# Patient Record
Sex: FEMALE | Race: WHITE | Employment: FULL TIME | URBAN - METROPOLITAN AREA
[De-identification: names, ages, dates, MRNs, and addresses within clinical notes are randomized per-mention and may not be internally consistent; named-entity substitution may affect disease eponyms.]

---

## 2019-05-31 ENCOUNTER — APPOINTMENT (OUTPATIENT)
Dept: GENERAL RADIOLOGY | Age: 49
End: 2019-05-31

## 2019-05-31 ENCOUNTER — HOSPITAL ENCOUNTER (EMERGENCY)
Age: 49
Discharge: HOME | End: 2019-05-31
Attending: EMERGENCY MEDICINE

## 2019-05-31 VITALS
WEIGHT: 265 LBS | SYSTOLIC BLOOD PRESSURE: 178 MMHG | OXYGEN SATURATION: 100 % | BODY MASS INDEX: 44.15 KG/M2 | HEIGHT: 65 IN | RESPIRATION RATE: 16 BRPM | TEMPERATURE: 97.8 F | HEART RATE: 84 BPM | DIASTOLIC BLOOD PRESSURE: 105 MMHG

## 2019-05-31 DIAGNOSIS — M17.11 OSTEOARTHRITIS OF RIGHT KNEE, UNSPECIFIED OSTEOARTHRITIS TYPE: ICD-10-CM

## 2019-05-31 DIAGNOSIS — M25.461 KNEE EFFUSION, RIGHT: Primary | ICD-10-CM

## 2019-05-31 PROCEDURE — 99283 EMERGENCY DEPT VISIT LOW MDM: CPT

## 2019-05-31 PROCEDURE — 73560 X-RAY EXAM OF KNEE 1 OR 2: CPT

## 2019-05-31 ASSESSMENT — PAIN DESCRIPTION - DESCRIPTORS: DESCRIPTORS: THROBBING

## 2019-05-31 ASSESSMENT — PAIN SCALES - GENERAL: PAINLEVEL_OUTOF10: 5

## 2019-05-31 ASSESSMENT — PAIN DESCRIPTION - FREQUENCY: FREQUENCY: CONTINUOUS

## 2019-05-31 ASSESSMENT — ENCOUNTER SYMPTOMS
ABDOMINAL PAIN: 0
SHORTNESS OF BREATH: 0
BACK PAIN: 0

## 2019-05-31 ASSESSMENT — PAIN DESCRIPTION - ORIENTATION: ORIENTATION: RIGHT

## 2019-05-31 ASSESSMENT — PAIN DESCRIPTION - PROGRESSION: CLINICAL_PROGRESSION: NOT CHANGED

## 2019-05-31 ASSESSMENT — PAIN DESCRIPTION - LOCATION: LOCATION: KNEE

## 2019-05-31 ASSESSMENT — PAIN DESCRIPTION - ONSET: ONSET: ON-GOING

## 2019-05-31 ASSESSMENT — PAIN DESCRIPTION - PAIN TYPE: TYPE: ACUTE PAIN

## 2019-05-31 NOTE — ED PROVIDER NOTES
Patient is known to have arthritis in her right knee she says that recently she is to develop some swelling in it which makes it more difficult to walk  She denies any recent plane rides, long car rides  Recent surgeries or  Denies history of DVT  Denies chest pain shortness breath all of her discomfort is localized to the right knee itself  Patient denies fever chills patient is not immunocompromised    The history is provided by the patient. Knee Problem   Location:  Knee  Knee location:  R knee  Pain details:     Quality:  Aching    Radiates to:  Does not radiate    Severity:  Moderate    Onset quality:  Gradual    Timing:  Constant    Progression:  Worsening  Chronicity:  New  Dislocation: no    Foreign body present:  No foreign bodies  Prior injury to area:  No  Associated symptoms: no back pain, no fever and no neck pain        Review of Systems   Constitutional: Negative for chills and fever. Respiratory: Negative for shortness of breath. Cardiovascular: Negative for chest pain. Gastrointestinal: Negative for abdominal pain. Musculoskeletal: Positive for arthralgias, gait problem and joint swelling. Negative for back pain and neck pain. Neurological: Negative for dizziness. Psychiatric/Behavioral: Negative for behavioral problems. All other systems reviewed and are negative. Patient Vitals for the past 24 hrs:   BP Temp Temp src Pulse Resp SpO2 Height Weight   05/31/19 1703 (!) 178/105 97.8 °F (36.6 °C) Oral 84 16 100 % 5' 5\" (1.651 m) 265 lb (120.2 kg)       Physical Exam   Constitutional: She is oriented to person, place, and time. She appears well-developed and well-nourished. No distress. HENT:   Head: Normocephalic. Right Ear: External ear normal.   Left Ear: External ear normal.   Eyes: Pupils are equal, round, and reactive to light. Conjunctivae and EOM are normal.   Neck: Normal range of motion. Neck supple. No thyromegaly present.    Cardiovascular: Normal rate, regular rhythm, normal heart sounds and intact distal pulses. Exam reveals no gallop and no friction rub. No murmur heard. Pulmonary/Chest: Effort normal and breath sounds normal. No respiratory distress. Abdominal: Soft. Bowel sounds are normal. She exhibits no distension. There is no tenderness. Musculoskeletal:        Right knee: She exhibits decreased range of motion, swelling and effusion. Tenderness found. Neurological: She is alert and oriented to person, place, and time. She displays normal reflexes. No cranial nerve deficit or sensory deficit. She exhibits normal muscle tone. Coordination normal. GCS eye subscore is 4. GCS verbal subscore is 5. GCS motor subscore is 6. Skin: She is not diaphoretic. Psychiatric: She has a normal mood and affect. Her behavior is normal.   Nursing note and vitals reviewed. Procedures    MDM         Labs      Radiology      EKG Interpretation. History reviewed. No pertinent past medical history. Past Surgical History:   Procedure Laterality Date    APPENDECTOMY      CHOLECYSTECTOMY      HYSTERECTOMY      TONSILLECTOMY         History reviewed. No pertinent family history.     Social History     Socioeconomic History    Marital status:      Spouse name: Not on file    Number of children: Not on file    Years of education: Not on file    Highest education level: Not on file   Occupational History    Not on file   Social Needs    Financial resource strain: Not on file    Food insecurity:     Worry: Not on file     Inability: Not on file    Transportation needs:     Medical: Not on file     Non-medical: Not on file   Tobacco Use    Smoking status: Never Smoker    Smokeless tobacco: Never Used   Substance and Sexual Activity    Alcohol use: No    Drug use: No    Sexual activity: Yes     Partners: Male   Lifestyle    Physical activity:     Days per week: Not on file     Minutes per session: Not on file    Stress: Not on file   Relationships  Social connections:     Talks on phone: Not on file     Gets together: Not on file     Attends [employer]ology service: Not on file     Active member of club or organization: Not on file     Attends meetings of clubs or organizations: Not on file     Relationship status: Not on file    Intimate partner violence:     Fear of current or ex partner: Not on file     Emotionally abused: Not on file     Physically abused: Not on file     Forced sexual activity: Not on file   Other Topics Concern    Not on file   Social History Narrative    Not on file       Patient Vitals for the past 24 hrs:   BP Temp Temp src Pulse Resp SpO2 Height Weight   05/31/19 1703 (!) 178/105 97.8 °F (36.6 °C) Oral 84 16 100 % 5' 5\" (1.651 m) 265 lb (120.2 kg)         Medications - No data to display    No results found for this visit on 05/31/19. Xr Knee Right (1-2 Views)    Result Date: 5/31/2019  EXAMINATION: 2 XRAY VIEWS OF THE RIGHT KNEE 5/31/2019 3:42 pm COMPARISON: 09/10/2013 HISTORY: ORDERING SYSTEM PROVIDED HISTORY: knee pain TECHNOLOGIST PROVIDED HISTORY: Reason for exam:->knee pain Ordering Physician Provided Reason for Exam: worsening rt knee pain x 2 days, no known injury, unable to straighten knee Acuity: Acute Type of Exam: Initial Relevant Medical/Surgical History: arthritis, rt knee meniscus surgery FINDINGS: Alignment is anatomic. No fractures or destructive bony abnormalities are seen. Tricompartmental osteoarthritic changes are noted. Joint effusion. 1. No acute bony abnormality 2. Tricompartmental osteoarthritic changes 3. Joint effusion       New Prescriptions    No medications on file       Your orthopedic physician            1. Knee effusion, right    2. Osteoarthritis of right knee, unspecified osteoarthritis type          Patient was advised at any time to return to the emergency department if there was any worsening.                Hung Sands MD  05/31/19 0674

## 2021-09-07 ENCOUNTER — APPOINTMENT (OUTPATIENT)
Dept: GENERAL RADIOLOGY | Age: 51
End: 2021-09-07
Payer: COMMERCIAL

## 2021-09-07 ENCOUNTER — HOSPITAL ENCOUNTER (EMERGENCY)
Age: 51
Discharge: HOME | End: 2021-09-07
Attending: EMERGENCY MEDICINE
Payer: COMMERCIAL

## 2021-09-07 VITALS
OXYGEN SATURATION: 100 % | WEIGHT: 285 LBS | RESPIRATION RATE: 16 BRPM | HEIGHT: 65 IN | DIASTOLIC BLOOD PRESSURE: 89 MMHG | BODY MASS INDEX: 47.48 KG/M2 | HEART RATE: 53 BPM | TEMPERATURE: 98.3 F | SYSTOLIC BLOOD PRESSURE: 144 MMHG

## 2021-09-07 DIAGNOSIS — R00.2 PALPITATIONS: Primary | ICD-10-CM

## 2021-09-07 LAB
A/G RATIO: 1.6 (ref 1.1–2.2)
ALBUMIN SERPL-MCNC: 4.3 G/DL (ref 3.4–5)
ALP BLD-CCNC: 96 U/L (ref 40–129)
ALT SERPL-CCNC: 16 U/L (ref 10–40)
ANION GAP SERPL CALCULATED.3IONS-SCNC: 10 MMOL/L (ref 3–16)
AST SERPL-CCNC: 18 U/L (ref 15–37)
BASOPHILS ABSOLUTE: 0.1 K/UL (ref 0–0.2)
BASOPHILS RELATIVE PERCENT: 1.5 %
BILIRUB SERPL-MCNC: 0.7 MG/DL (ref 0–1)
BUN BLDV-MCNC: 12 MG/DL (ref 7–20)
CALCIUM SERPL-MCNC: 9.1 MG/DL (ref 8.3–10.6)
CHLORIDE BLD-SCNC: 103 MMOL/L (ref 99–110)
CO2: 26 MMOL/L (ref 21–32)
CREAT SERPL-MCNC: 0.7 MG/DL (ref 0.6–1.1)
EKG ATRIAL RATE: 55 BPM
EKG DIAGNOSIS: NORMAL
EKG P AXIS: 48 DEGREES
EKG P-R INTERVAL: 122 MS
EKG Q-T INTERVAL: 420 MS
EKG QRS DURATION: 82 MS
EKG QTC CALCULATION (BAZETT): 401 MS
EKG R AXIS: 3 DEGREES
EKG T AXIS: 11 DEGREES
EKG VENTRICULAR RATE: 55 BPM
EOSINOPHILS ABSOLUTE: 0.2 K/UL (ref 0–0.6)
EOSINOPHILS RELATIVE PERCENT: 3 %
GFR AFRICAN AMERICAN: >60
GFR NON-AFRICAN AMERICAN: >60
GLOBULIN: 2.7 G/DL
GLUCOSE BLD-MCNC: 106 MG/DL (ref 70–99)
HCT VFR BLD CALC: 39.7 % (ref 36–48)
HEMOGLOBIN: 13.8 G/DL (ref 12–16)
LYMPHOCYTES ABSOLUTE: 1.5 K/UL (ref 1–5.1)
LYMPHOCYTES RELATIVE PERCENT: 22.8 %
MCH RBC QN AUTO: 30.2 PG (ref 26–34)
MCHC RBC AUTO-ENTMCNC: 34.6 G/DL (ref 31–36)
MCV RBC AUTO: 87.2 FL (ref 80–100)
MONOCYTES ABSOLUTE: 0.4 K/UL (ref 0–1.3)
MONOCYTES RELATIVE PERCENT: 5.9 %
NEUTROPHILS ABSOLUTE: 4.3 K/UL (ref 1.7–7.7)
NEUTROPHILS RELATIVE PERCENT: 66.8 %
PDW BLD-RTO: 13.9 % (ref 12.4–15.4)
PLATELET # BLD: 270 K/UL (ref 135–450)
PMV BLD AUTO: 7.9 FL (ref 5–10.5)
POTASSIUM REFLEX MAGNESIUM: 3.6 MMOL/L (ref 3.5–5.1)
RBC # BLD: 4.55 M/UL (ref 4–5.2)
SODIUM BLD-SCNC: 139 MMOL/L (ref 136–145)
TOTAL PROTEIN: 7 G/DL (ref 6.4–8.2)
TROPONIN: <0.01 NG/ML
WBC # BLD: 6.4 K/UL (ref 4–11)

## 2021-09-07 PROCEDURE — 71045 X-RAY EXAM CHEST 1 VIEW: CPT

## 2021-09-07 PROCEDURE — 84443 ASSAY THYROID STIM HORMONE: CPT

## 2021-09-07 PROCEDURE — 93005 ELECTROCARDIOGRAM TRACING: CPT | Performed by: EMERGENCY MEDICINE

## 2021-09-07 PROCEDURE — 84484 ASSAY OF TROPONIN QUANT: CPT

## 2021-09-07 PROCEDURE — 99284 EMERGENCY DEPT VISIT MOD MDM: CPT

## 2021-09-07 PROCEDURE — 36415 COLL VENOUS BLD VENIPUNCTURE: CPT

## 2021-09-07 PROCEDURE — 80053 COMPREHEN METABOLIC PANEL: CPT

## 2021-09-07 PROCEDURE — 93010 ELECTROCARDIOGRAM REPORT: CPT | Performed by: INTERNAL MEDICINE

## 2021-09-07 PROCEDURE — 85025 COMPLETE CBC W/AUTO DIFF WBC: CPT

## 2021-09-07 RX ORDER — OXYBUTYNIN CHLORIDE 5 MG/1
5 TABLET ORAL 2 TIMES DAILY
COMMUNITY

## 2021-09-07 NOTE — ED PROVIDER NOTES
[facility] EMERGENCY DEPARTMENT      CHIEF COMPLAINT  Palpitations (Pt reports that she started having palpitations yesterday. Pt saw her pulmonologist and allergist today and was given an order for an ekg. Pt reports that she was unable to get the ekg done, so she came to the ED. Denies any pain. )       HISTORY OF PRESENT ILLNESS  Taryn Moffett is a 44 y.o. female  who presents to the ED complaining of palpitations. Patient states that she gets a fluttering under her left breast and substernal area that started yesterday. She states that every 10 to 15 minutes that occurs and is somewhat fleeting. It does not take her breath away or really cause any pain or discomfort. No lightheadedness. She states that she has a history of PVCs which do take her breath away and this feels different. She is at her pulmonologist and allergist today and her pulmonologist stated that they felt that she should get an EKG. She states that she was unable to get it done today [facility] and they were concerned about her symptoms so sent her here for further evaluation. She denies any current symptoms at this moment while talking to me. She denies any leg pain or swelling. No recent travel. She had Covid back in September and states that her breathing has not been great ever since then and she does have a history of COPD. She denies any known fevers. She has since been vaccinated against Covid. She denies any personal history of thyroid issues but does have a family history of such. She also has a history of A. fib in the family but none personally herself as well. No other complaints, modifying factors or associated symptoms. I have reviewed the following from the nursing documentation. History reviewed. No pertinent past medical history.   Past Surgical History:   Procedure Laterality Date    APPENDECTOMY      CHOLECYSTECTOMY      HYSTERECTOMY      TONSILLECTOMY       History reviewed. No pertinent family history. Social History     Socioeconomic History    Marital status:      Spouse name: Not on file    Number of children: Not on file    Years of education: Not on file    Highest education level: Not on file   Occupational History    Not on file   Tobacco Use    Smoking status: Never Smoker    Smokeless tobacco: Never Used   Substance and Sexual Activity    Alcohol use: No    Drug use: No    Sexual activity: Yes     Partners: Male   Other Topics Concern    Not on file   Social History Narrative    Not on file     Social Determinants of Health     Financial Resource Strain:     Difficulty of Paying Living Expenses:    Food Insecurity:     Worried About Running Out of Food in the Last Year:     [address] in the Last Year:    Transportation Needs:     Lack of Transportation (Medical):  Lack of Transportation (Non-Medical):    Physical Activity:     Days of Exercise per Week:     Minutes of Exercise per Session:    Stress:     Feeling of Stress :    Social Connections:     Frequency of Communication with Friends and Family:     Frequency of Social Gatherings with Friends and Family:     Attends Denominational Services:     Active Member of Clubs or Organizations:     Attends Club or Organization Meetings:     Marital Status:    Intimate Partner Violence:     Fear of Current or Ex-Partner:     Emotionally Abused:     Physically Abused:     Sexually Abused:      No current facility-administered medications for this encounter.      Current Outpatient Medications   Medication Sig Dispense Refill    metFORMIN (GLUCOPHAGE) 500 MG tablet Take 500 mg by mouth daily (with breakfast)      oxybutynin (DITROPAN) 5 MG tablet Take 5 mg by mouth 2 times daily      hydrochlorothiazide (HYDRODIURIL) 25 MG tablet Take 25 mg by mouth daily      montelukast (SINGULAIR) 10 MG tablet Take 10 mg by mouth nightly       Allergies   Allergen Reactions    Vicoprofen [Hydrocodone-Ibuprofen]        REVIEW OF SYSTEMS  10 systems reviewed, pertinent positives per HPI otherwise noted to be negative. PHYSICAL EXAM  BP (!) 160/73   Pulse 54   Temp 98.3 °F (36.8 °C) (Oral)   Resp 18   Ht 5' 5\" (1.651 m)   Wt 285 lb (129.3 kg)   SpO2 100%   BMI 47.43 kg/m²    GENERAL APPEARANCE: Awake and alert. Cooperative. No acute distress. HENT: Normocephalic. Atraumatic. Mucous membranes are moist.  No drooling or stridor. No posterior pharyngeal erythema or exudate. Uvula midline and nonedematous. NECK: Supple. No thyromegaly or thyroid nodules palpable. Trachea midline. No cervical lymphadenopathy. No nuchal rigidity. EYES: PERRL. EOM's grossly intact. HEART/CHEST: RRR. No murmurs. 2+ radial pulses bilaterally. LUNGS: Respirations unlabored. CTAB. Good air exchange. Speaking comfortably in full sentences. ABDOMEN: No tenderness. Soft. Non-distended. No masses. No organomegaly. No guarding or rebound. MUSCULOSKELETAL: No extremity edema. Compartments soft. No deformity. No tenderness in the extremities. All extremities neurovascularly intact. SKIN: Warm and dry. No acute rashes. NEUROLOGICAL: Alert and oriented. CN's 2-12 intact. No gross facial drooping. No gross focal deficits. PSYCHIATRIC: Normal mood and affect. LABS  I have reviewed all labs for this visit.    Results for orders placed or performed during the hospital encounter of 09/07/21   CBC auto differential   Result Value Ref Range    WBC 6.4 4.0 - 11.0 K/uL    RBC 4.55 4.00 - 5.20 M/uL    Hemoglobin 13.8 12.0 - 16.0 g/dL    Hematocrit 39.7 36.0 - 48.0 %    MCV 87.2 80.0 - 100.0 fL    MCH 30.2 26.0 - 34.0 pg    MCHC 34.6 31.0 - 36.0 g/dL    RDW 13.9 12.4 - 15.4 %    Platelets 596 830 - 500 K/uL    MPV 7.9 5.0 - 10.5 fL    Neutrophils % 66.8 %    Lymphocytes % 22.8 %    Monocytes % 5.9 %    Eosinophils % 3.0 %    Basophils % 1.5 %    Neutrophils Absolute 4.3 1.7 - 7.7 K/uL Lymphocytes Absolute 1.5 1.0 - 5.1 K/uL    Monocytes Absolute 0.4 0.0 - 1.3 K/uL    Eosinophils Absolute 0.2 0.0 - 0.6 K/uL    Basophils Absolute 0.1 0.0 - 0.2 K/uL   Comprehensive Metabolic Panel w/ Reflex to MG   Result Value Ref Range    Sodium 139 136 - 145 mmol/L    Potassium reflex Magnesium 3.6 3.5 - 5.1 mmol/L    Chloride 103 99 - 110 mmol/L    CO2 26 21 - 32 mmol/L    Anion Gap 10 3 - 16    Glucose 106 (H) 70 - 99 mg/dL    BUN 12 7 - 20 mg/dL    CREATININE 0.7 0.6 - 1.1 mg/dL    GFR Non-African American >60 >60    GFR African American >60 >60    Calcium 9.1 8.3 - 10.6 mg/dL    Total Protein 7.0 6.4 - 8.2 g/dL    Albumin 4.3 3.4 - 5.0 g/dL    Albumin/Globulin Ratio 1.6 1.1 - 2.2    Total Bilirubin 0.7 0.0 - 1.0 mg/dL    Alkaline Phosphatase 96 40 - 129 U/L    ALT 16 10 - 40 U/L    AST 18 15 - 37 U/L    Globulin 2.7 g/dL   Troponin   Result Value Ref Range    Troponin <0.01 <0.01 ng/mL   EKG 12 Lead   Result Value Ref Range    Ventricular Rate 55 BPM    Atrial Rate 55 BPM    P-R Interval 122 ms    QRS Duration 82 ms    Q-T Interval 420 ms    QTc Calculation (Bazett) 401 ms    P Axis 48 degrees    R Axis 3 degrees    T Axis 11 degrees    Diagnosis       Sinus bradycardiaMinimal voltage criteria for LVH, may be normal variantBorderline ECGConfirmed by OBEY Ackerman MD ([id number]) on 9/7/2021 3:44:53 PM       ECG  The Ekg interpreted by me shows  sinus bradycardia, rate=55  Axis is   Normal  QTc is  normal  Intervals and Durations are unremarkable. ST Segments: nonspecific changes. TWI lead III, stable from prior. No significant change from prior EKG dated 9/8/14. RADIOLOGY  XR CHEST PORTABLE    Result Date: 9/7/2021  EXAMINATION: ONE XRAY VIEW OF THE CHEST 9/7/2021 3:24 pm COMPARISON: None. HISTORY: ORDERING SYSTEM PROVIDED HISTORY: palpitations TECHNOLOGIST PROVIDED HISTORY: Reason for exam:->palpitations Reason for Exam: palpitations X 2 days Acuity: Acute Type of Exam: Initial FINDINGS: Clear lungs. No pleural effusion or pneumothorax. Cardiomediastinal silhouette is unremarkable. Visualized osseous structures are unremarkable. Clear lungs. ED COURSE/MDM  Patient seen and evaluated. Old records reviewed. Labs and imaging reviewed and results discussed with patient. Patient presenting for evaluation of palpitations. EKG without arrhythmia. Patient is overall well-appearing. No actual chest pain associated with this. Patient's electrolytes are unremarkable. Troponin negative. At this time, no evidence of acute life-threatening cause to symptoms and I feel that is reasonable to discharge patient home with continuation of work-up and evaluation as an outpatient. We discussed next steps as potentially a Holter monitor, follow-up with her PCP with her thyroid testing results, and other testing as determined appropriate by her PCP or specialist.  Patient very much and was in agreement of that plan. Reasons to return to the ER sooner were discussed and all questions answered at time of discharge. I estimate there is LOW risk for PULMONARY EMBOLISM, ACUTE CORONARY SYNDROME, OR THORACIC AORTIC DISSECTION, thus I consider the discharge disposition reasonable. Naveed Shook and I have discussed the diagnosis and risks, and we agree with discharging home to follow-up with their primary doctor. We also discussed returning to the Emergency Department immediately if new or worsening symptoms occur. We have discussed the symptoms which are most concerning (e.g., bloody sputum, fever, worsening pain or shortness of breath, vomiting) that necessitate immediate return. During the patient's ED course, the patient was given:  Medications - No data to display     CLINICAL IMPRESSION  1. Palpitations        Blood pressure (!) 160/73, pulse 54, temperature 98.3 °F (36.8 °C), temperature source Oral, resp.  rate 18, height 5' 5\" (1.651 m), weight 285 lb (129.3 kg), SpO2 100 %, not currently breastfeeding. DISPOSITION  Taryn Harrison Apt was discharged to home in stable condition. Patient was given scripts for the following medications. I counseled patient how to take these medications. New Prescriptions    No medications on file       Follow-up with:  WINDY Quiroga [facility]  [phone]    Schedule an appointment as soon as possible for a visit in 2 days  For recheck      DISCLAIMER: This chart was created using Dragon dictation software. Efforts were made by me to ensure accuracy, however some errors may be present due to limitations of this technology and occasionally words are not transcribed correctly.         Gt Subramanian MD  09/07/21 0414

## 2021-09-07 NOTE — ED TRIAGE NOTES
Chief Complaint   Patient presents with    Palpitations     Pt reports that she started having palpitations yesterday. Pt saw her pulmonologist and allergist today and was given an order for an ekg. Pt reports that she was unable to get the ekg done, so she came to the ED. Denies any pain.

## 2021-09-08 LAB — TSH REFLEX: 1.92 UIU/ML (ref 0.27–4.2)

## 2021-09-13 PROBLEM — I10 ESSENTIAL HYPERTENSION: Status: ACTIVE | Noted: 2021-09-13

## 2021-09-13 PROBLEM — R00.2 PALPITATIONS: Status: ACTIVE | Noted: 2021-09-13

## 2021-09-13 NOTE — PROGRESS NOTES
2021    PATIENT: Jeffry Gee  : 1970    Primary Care Provider:   WINDY Coleman CNP  M:[phone]  f:None    Reason for evaluation:   Chief Complaint   Patient presents with    Palpitations    Established New Doctor     History of present illness:   Ms. Jeffry Gee is a 44 y.o. female patient here following recent ER visit regarding palpitations. She lives in [facility] and states that most of her care has been in the [facility] ([facility]). These records were not available prior to revisit. However, she is able to go through her electronic [employer] while we discuss her history. She recalls wearing a 24-hour monitor in  that revealed occasional PACs and PVCs. No beta-blocker at that time. She had an echocardiogram in 2020 : Report conclusion personally reviewed LV ejection fraction of 65%, mild LVH, grade 2 diastolic dysfunction, dilated left atrium, mild tricuspid regurgitation, mild pulmonary hypertension. She reports having COVID-19 infection in 2020. She shows results for pulmonary function test with severe obstructive lung disease in 2021. She states that she is compliant with her inhaler as well as medications. Her CPAP however became difficult when having the virus. She admits she has not been as compliant recently with this. She reports chronic dyspnea on exertion and her family member present today agrees with her that it has been noticeable in the past year. The episode of palpitations that took her to the emergency room she states felt \"beating really fast into the throat and left breast\". These features concerned her. They have not recurred. She is on Metformin for prediabetes and hydrochlorothiazide more recently for diagnosis of hypertension. She reports a family history of atrial fibrillation in both her mother and father.   She states her father has pulmonary hypertension. Details unknown. Medical History:      Diagnosis Date    Acid reflux     Chronic diastolic heart failure (HCC)     COPD (chronic obstructive pulmonary disease) (HCC)     Hypertension     FREIDA (obstructive sleep apnea)     PCOS (polycystic ovarian syndrome)     Pulmonary hypertension (HCC)     PVC (premature ventricular contraction)     Urge incontinence        Surgical History:      Procedure Laterality Date    APPENDECTOMY      CARPAL TUNNEL RELEASE Bilateral     CHOLECYSTECTOMY      CYST REMOVAL Right     finger     HYSTERECTOMY      TONSILLECTOMY         Social History:  Social History     Socioeconomic History    Marital status:      Spouse name: Not on file    Number of children: Not on file    Years of education: Not on file    Highest education level: Not on file   Occupational History    Not on file   Tobacco Use    Smoking status: Never Smoker    Smokeless tobacco: Never Used   Substance and Sexual Activity    Alcohol use: No    Drug use: No    Sexual activity: Yes     Partners: Male   Other Topics Concern    Not on file   Social History Narrative    Not on file     Social Determinants of Health     Financial Resource Strain:     Difficulty of Paying Living Expenses:    Food Insecurity:     Worried About Running Out of Food in the Last Year:     Ran Out of Food in the Last Year:    Transportation Needs:     Lack of Transportation (Medical):      Lack of Transportation (Non-Medical):    Physical Activity:     Days of Exercise per Week:     Minutes of Exercise per Session:    Stress:     Feeling of Stress :    Social Connections:     Frequency of Communication with Friends and Family:     Frequency of Social Gatherings with Friends and Family:     Attends [employer] Services:     Active Member of Clubs or Organizations:     Attends Club or Organization Meetings:     Marital Status:    Intimate Partner Violence:     Fear of Current or Ex-Partner:     Emotionally Abused:     Physically Abused:     Sexually Abused:         Family History:  No evidence for sudden cardiac death or premature CAD. Problem Relation Age of Onset    Atrial Fibrillation Mother     Atrial Fibrillation Father        Medications:  [x] Medications and dosages reviewed. Prior to Admission medications    Medication Sig Start Date End Date Taking?  Authorizing Provider   albuterol sulfate HFA (VENTOLIN HFA) 108 (90 Base) MCG/ACT inhaler Ventolin HFA 90 mcg/actuation aerosol inhaler   INL 1 PUFF PO Q 4 H PRN   Yes Historical Provider, MD   Glycopyrrolate-Formoterol (BEVESPI AEROSPHERE IN) Inhale into the lungs   Yes Historical Provider, MD   azelastine (ASTELIN) 0.1 % nasal spray 1 spray by Nasal route 2 times daily Use in each nostril as directed   Yes Historical Provider, MD   metFORMIN (GLUCOPHAGE) 500 MG tablet Take 500 mg by mouth daily (with breakfast)   Yes Historical Provider, MD   oxybutynin (DITROPAN) 5 MG tablet Take 5 mg by mouth 2 times daily   Yes Historical Provider, MD   hydrochlorothiazide (HYDRODIURIL) 25 MG tablet Take 25 mg by mouth daily   Yes Historical Provider, MD   montelukast (SINGULAIR) 10 MG tablet Take 10 mg by mouth nightly   Yes Historical Provider, MD   fluticasone (FLONASE) 50 MCG/ACT nasal spray 1 spray by Each Nostril route daily 9/17/21   Lluvia Winters MD   Fluticasone furoate-vilanterol (BREO ELLIPTA) 200-25 MCG/INH AEPB inhaler Inhale 1 puff into the lungs daily 9/17/21   Lluvia Winters MD       Allergies:  Vicoprofen [hydrocodone-ibuprofen]     Review of Systems:    [x]Full ROS obtained and negative except as mentioned in HPI    Physical Examination:    BP (!) 154/104 (Site: Right Lower Arm, Position: Sitting, Cuff Size: Medium Adult)   Pulse 59   Ht 5' 5\" (1.651 m)   Wt 287 lb 11.2 oz (130.5 kg)   SpO2 98%   BMI 47.88 kg/m²   Wt Readings from Last 3 Encounters:   09/17/21 285 lb (129.3 kg)   09/14/21 287 lb 11.2 oz (130.5 kg) 09/07/21 285 lb (129.3 kg)     Vitals:    09/14/21 1501   BP: (!) 154/104   Pulse:    SpO2:        · GENERAL: Well developed, well nourished, no acute distress  · NEUROLOGICAL: Alert and oriented x3  · PSYCH: Normal mood and affect   · SKIN: Warm and dry  · HEENT: Normocephalic, atraumatic, Sclera non-icteric, mucous membranes moist  · NECK: supple, JVP normal  · CARDIAC: Normal PMI, regular rate and rhythm, normal S1S2, no murmur, rub, or gallop  · RESPIRATORY: Normal respiratory effort, clear to auscultation bilaterally  · EXTREMITIES: no edema or clubbing, +2 pulses bilaterally   · MUSCULOSKELETAL: No joint swelling or tenderness, no chest wall tenderness  · GASTROINTESTINAL: normal bowel sounds, soft, non-tender    Labs:  Lab Review   Admission on 09/07/2021, Discharged on 09/07/2021   Component Date Value    Ventricular Rate 09/07/2021 55     Atrial Rate 09/07/2021 55     P-R Interval 09/07/2021 122     QRS Duration 09/07/2021 82     Q-T Interval 09/07/2021 420     QTc Calculation (Bazett) 09/07/2021 401     P Axis 09/07/2021 48     R Axis 09/07/2021 3     T Axis 09/07/2021 11     Diagnosis 09/07/2021 Sinus bradycardiaMinimal voltage criteria for LVH, may be normal variantBorderline ECGConfirmed by OBEY Moura MD ([id number]) on 9/7/2021 3:44:53 PM     WBC 09/07/2021 6.4     RBC 09/07/2021 4.55     Hemoglobin 09/07/2021 13.8     Hematocrit 09/07/2021 39.7     MCV 09/07/2021 87.2     MCH 09/07/2021 30.2     MCHC 09/07/2021 34.6     RDW 09/07/2021 13.9     Platelets 68/50/0249 270     MPV 09/07/2021 7.9     Neutrophils % 09/07/2021 66.8     Lymphocytes % 09/07/2021 22.8     Monocytes % 09/07/2021 5.9     Eosinophils % 09/07/2021 3.0     Basophils % 09/07/2021 1.5     Neutrophils Absolute 09/07/2021 4.3     Lymphocytes Absolute 09/07/2021 1.5     Monocytes Absolute 09/07/2021 0.4     Eosinophils Absolute 09/07/2021 0.2     Basophils Absolute 09/07/2021 0.1     Sodium 09/07/2021 139  Potassium reflex Magnesi* 09/07/2021 3.6     Chloride 09/07/2021 103     CO2 09/07/2021 26     Anion Gap 09/07/2021 10     Glucose 09/07/2021 106*    BUN 09/07/2021 12     CREATININE 09/07/2021 0.7     GFR Non- 09/07/2021 >60     GFR  09/07/2021 >60     Calcium 09/07/2021 9.1     Total Protein 09/07/2021 7.0     Albumin 09/07/2021 4.3     Albumin/Globulin Ratio 09/07/2021 1.6     Total Bilirubin 09/07/2021 0.7     Alkaline Phosphatase 09/07/2021 96     ALT 09/07/2021 16     AST 09/07/2021 18     Globulin 09/07/2021 2.7     Troponin 09/07/2021 <0.01     TSH 09/07/2021 1.92        Imaging:  I have reviewed the below testing personally:    EKG 9/8/14  Normal sinus rhythm   Early repolarization    EKG 9/7/21  Sinus bradycardia  Minimal voltage criteria for LVH, may be normal variant     CXR 9/7/21  FINDINGS:   Clear lungs. No pleural effusion or pneumothorax. Cardiomediastinal silhouette is unremarkable. Visualized osseous structures are unremarkable. [address] [facility] Julianna Howard records through patient's MyChart at time of visit:   June 2020 : TTE report conclusion personally reviewed LV ejection fraction of 65%, mild LVH, grade 2 diastolic dysfunction, dilated left atrium, mild tricuspid regurgitation, mild pulmonary hypertension. January 2021: Pulmonary function test with severe obstructive disease    1/2021: , LDL 87, TG 87, HDL 65     9/7/21  Troponin negative  TSH 1.92  K 3.6  Hemoglobin 13.8  Hematocrit 39.7      Impression/Recommendations    Ms. Mishel Hoffman is a 44 y.o. female patient with:    Palpitations  Dyspnea on exertion  Hypertension  Prediabetes  Morbid Obesity   FREIDA  Asthma   Hx. COVID 19 Infection 9/2020      In meeting Taryn for the first time, she reports palpitations with a first-time Holter monitor in 2019. She reports an insignificant amount of PACs and PVCs and no beta-blocker at that time.   Baseline echocardiogram last year was with mild, largely hypertensive related findings. She states she was just recently started on hydrochlorothiazide. I have asked her to keep blood pressure logging for us to review and she is leaving with a 30 day monitor. We will update echocardiogram. I have also asked her to establish with Pulmonology as PFTs from earlier this year suggested severe obstructive disease. They can review her medications/inhalers as well as CPAP. Orders Placed This Encounter   Procedures   Payam Rutherford MD, Pulmonary, [facility]     Referral Priority:   Routine     Referral Type:   Eval and Treat     Referral Reason:   Specialty Services Required     Referred to Provider:   Anna Saldana MD     Requested Specialty:   Pulmonology     Number of Visits Requested:   1    Cardiac event monitor     30 days     Standing Status:   Future     Standing Expiration Date:   9/14/2022    Echo 2D w doppler w color complete     Standing Status:   Future     Standing Expiration Date:   9/14/2022     Order Specific Question:   Reason for exam:     Answer:   hx pvc, htn, palpitations     Return for Echocardiogram.  Patient Instructions   Echocardiogram   Event monitor (30 days)    We will call you after testing with results     Consider asking your pulmonologist if it is worth repeating any pulmonary testing since you are further out from your past COVID-19 infection     Thank you for allowing me to participate in the care of your patient. Please do not hesitate to call. Patricia Flowers DO, [facility]  Interventional Cardiology     o: [phone]  [address] [place] [address]., [address], [address] [facility]      NOTE:  This report was transcribed using voice recognition software. Every effort was made to ensure accuracy; however, inadvertent computerized transcription errors may be present. Scribe's Attestation:  This note was scribed in the presence of Dr. Greg Collazo DO by Leny Sheldon RN.    I, Patricia Flowers, have personally performed the services described in this documentation as scribed by Pia Davis RN in my presence, and it is both accurate and complete. An electronic signature was used to authenticate this note.

## 2021-09-14 ENCOUNTER — OFFICE VISIT (OUTPATIENT)
Dept: CARDIOLOGY CLINIC | Age: 51
End: 2021-09-14
Payer: COMMERCIAL

## 2021-09-14 VITALS
HEART RATE: 59 BPM | HEIGHT: 65 IN | OXYGEN SATURATION: 98 % | BODY MASS INDEX: 47.93 KG/M2 | WEIGHT: 287.7 LBS | SYSTOLIC BLOOD PRESSURE: 154 MMHG | DIASTOLIC BLOOD PRESSURE: 104 MMHG

## 2021-09-14 DIAGNOSIS — Z86.16 HISTORY OF 2019 NOVEL CORONAVIRUS DISEASE (COVID-19): ICD-10-CM

## 2021-09-14 DIAGNOSIS — R00.2 PALPITATIONS: Primary | ICD-10-CM

## 2021-09-14 DIAGNOSIS — J44.9 CHRONIC OBSTRUCTIVE PULMONARY DISEASE, UNSPECIFIED COPD TYPE (HCC): ICD-10-CM

## 2021-09-14 DIAGNOSIS — R06.02 SOB (SHORTNESS OF BREATH): ICD-10-CM

## 2021-09-14 DIAGNOSIS — I10 ESSENTIAL HYPERTENSION: ICD-10-CM

## 2021-09-14 DIAGNOSIS — I49.3 PVC (PREMATURE VENTRICULAR CONTRACTION): ICD-10-CM

## 2021-09-14 PROCEDURE — 1036F TOBACCO NON-USER: CPT | Performed by: INTERNAL MEDICINE

## 2021-09-14 PROCEDURE — G8427 DOCREV CUR MEDS BY ELIG CLIN: HCPCS | Performed by: INTERNAL MEDICINE

## 2021-09-14 PROCEDURE — 3023F SPIROM DOC REV: CPT | Performed by: INTERNAL MEDICINE

## 2021-09-14 PROCEDURE — 3017F COLORECTAL CA SCREEN DOC REV: CPT | Performed by: INTERNAL MEDICINE

## 2021-09-14 PROCEDURE — G8926 SPIRO NO PERF OR DOC: HCPCS | Performed by: INTERNAL MEDICINE

## 2021-09-14 PROCEDURE — G8417 CALC BMI ABV UP PARAM F/U: HCPCS | Performed by: INTERNAL MEDICINE

## 2021-09-14 PROCEDURE — 99204 OFFICE O/P NEW MOD 45 MIN: CPT | Performed by: INTERNAL MEDICINE

## 2021-09-14 RX ORDER — ALBUTEROL SULFATE 90 UG/1
AEROSOL, METERED RESPIRATORY (INHALATION)
COMMUNITY

## 2021-09-14 RX ORDER — AZELASTINE 1 MG/ML
1 SPRAY, METERED NASAL 2 TIMES DAILY
COMMUNITY

## 2021-09-14 NOTE — PATIENT INSTRUCTIONS
Echocardiogram   Event monitor (30 days)    We will call you after testing with results     Consider asking your pulmonologist if it is worth repeating any pulmonary testing since you are further out from your past COVID-19 infection

## 2021-09-14 NOTE — LETTER
[address] Cardiology [facility]  [address] [address]  Phone: [phone]  Fax: 200 [facility] , DO    2021     WINDY Slaughter CNP  [address]    Patient: Desire Zamudio   MR Number: [id number]   YOB: 1970   Date of Visit: 2021       Dear Isa Edmonds:                                         2021    PATIENT: Desire Zamudio  : 1970    Primary Care Provider:   WINDY Slaughter CNP  Q:[phone]  f:None    Reason for evaluation:   Chief Complaint   Patient presents with    Palpitations    Established New Doctor     History of present illness:   Ms. Desire Zamudio is a 44 y.o. female patient here following recent ER visit regarding palpitations. She lives in [address] and states that most of her care has been in the [facility] system ([facility]). These records were not available prior to revisit. However, she is able to go through her electronic MyChart while we discuss her history. She recalls wearing a 24-hour monitor in  that revealed occasional PACs and PVCs. No beta-blocker at that time. She had an echocardiogram in 2020 : Report conclusion personally reviewed LV ejection fraction of 65%, mild LVH, grade 2 diastolic dysfunction, dilated left atrium, mild tricuspid regurgitation, mild pulmonary hypertension. She reports having COVID-19 infection in 2020. She shows results for pulmonary function test with severe obstructive lung disease in 2021. She states that she is compliant with her inhaler as well as medications. Her CPAP however became difficult when having the virus. She admits she has not been as compliant recently with this. She reports chronic dyspnea on exertion and her family member present today agrees with her that it has been noticeable in the past year.   The episode of palpitations that took her to the emergency room she states felt \"beating really fast into the throat and left breast\". These features concerned her. They have not recurred. She is on Metformin for prediabetes and hydrochlorothiazide more recently for diagnosis of hypertension. She reports a family history of atrial fibrillation in both her mother and father. She states her father has pulmonary hypertension. Details unknown. Medical History:      Diagnosis Date    Acid reflux     Chronic diastolic heart failure (HCC)     COPD (chronic obstructive pulmonary disease) (HCC)     Hypertension     FREIDA (obstructive sleep apnea)     PCOS (polycystic ovarian syndrome)     Pulmonary hypertension (HCC)     PVC (premature ventricular contraction)     Urge incontinence        Surgical History:      Procedure Laterality Date    APPENDECTOMY      CARPAL TUNNEL RELEASE Bilateral     CHOLECYSTECTOMY      CYST REMOVAL Right     finger     HYSTERECTOMY      TONSILLECTOMY         Social History:  Social History     Socioeconomic History    Marital status:      Spouse name: Not on file    Number of children: Not on file    Years of education: Not on file    Highest education level: Not on file   Occupational History    Not on file   Tobacco Use    Smoking status: Never Smoker    Smokeless tobacco: Never Used   Substance and Sexual Activity    Alcohol use: No    Drug use: No    Sexual activity: Yes     Partners: Male   Other Topics Concern    Not on file   Social History Narrative    Not on file     Social Determinants of Health     Financial Resource Strain:     Difficulty of Paying Living Expenses:    Food Insecurity:     Worried About Running Out of Food in the Last Year:     Ran Out of Food in the Last Year:    Transportation Needs:     Lack of Transportation (Medical):      Lack of Transportation (Non-Medical):    Physical Activity:     Days of Exercise per Week:     Minutes of Exercise per Session:    Stress:     Feeling of Stress :    Social Connections:     Frequency of Communication with Friends and Family:     Frequency of Social Gatherings with Friends and Family:     Attends Islam Services:     Active Member of Clubs or Organizations:     Attends Club or Organization Meetings:     Marital Status:    Intimate Partner Violence:     Fear of Current or Ex-Partner:     Emotionally Abused:     Physically Abused:     Sexually Abused:         Family History:  No evidence for sudden cardiac death or premature CAD. Problem Relation Age of Onset    Atrial Fibrillation Mother     Atrial Fibrillation Father        Medications:  [x] Medications and dosages reviewed. Prior to Admission medications    Medication Sig Start Date End Date Taking?  Authorizing Provider   albuterol sulfate HFA (VENTOLIN HFA) 108 (90 Base) MCG/ACT inhaler Ventolin HFA 90 mcg/actuation aerosol inhaler   INL 1 PUFF PO Q 4 H PRN   Yes Historical Provider, MD   Glycopyrrolate-Formoterol (BEVESPI AEROSPHERE IN) Inhale into the lungs   Yes Historical Provider, MD   azelastine (ASTELIN) 0.1 % nasal spray 1 spray by Nasal route 2 times daily Use in each nostril as directed   Yes Historical Provider, MD   metFORMIN (GLUCOPHAGE) 500 MG tablet Take 500 mg by mouth daily (with breakfast)   Yes Historical Provider, MD   oxybutynin (DITROPAN) 5 MG tablet Take 5 mg by mouth 2 times daily   Yes Historical Provider, MD   hydrochlorothiazide (HYDRODIURIL) 25 MG tablet Take 25 mg by mouth daily   Yes Historical Provider, MD   montelukast (SINGULAIR) 10 MG tablet Take 10 mg by mouth nightly   Yes Historical Provider, MD   fluticasone (FLONASE) 50 MCG/ACT nasal spray 1 spray by Each Nostril route daily 9/17/21   Rose Mary Sharma MD   Fluticasone furoate-vilanterol (BREO ELLIPTA) 200-25 MCG/INH AEPB inhaler Inhale 1 puff into the lungs daily 9/17/21   Rose Mary Sharma MD       Allergies:  Vicoprofen [hydrocodone-ibuprofen]     Review of Systems:    [x]Full ROS obtained and negative except as mentioned in HPI    Physical Examination:    BP (!) 154/104 (Site: Right Lower Arm, Position: Sitting, Cuff Size: Medium Adult)   Pulse 59   Ht 5' 5\" (1.651 m)   Wt 287 lb 11.2 oz (130.5 kg)   SpO2 98%   BMI 47.88 kg/m²   Wt Readings from Last 3 Encounters:   09/17/21 285 lb (129.3 kg)   09/14/21 287 lb 11.2 oz (130.5 kg)   09/07/21 285 lb (129.3 kg)     Vitals:    09/14/21 1501   BP: (!) 154/104   Pulse:    SpO2:        · GENERAL: Well developed, well nourished, no acute distress  · NEUROLOGICAL: Alert and oriented x3  · PSYCH: Normal mood and affect   · SKIN: Warm and dry  · HEENT: Normocephalic, atraumatic, Sclera non-icteric, mucous membranes moist  · NECK: supple, JVP normal  · CARDIAC: Normal PMI, regular rate and rhythm, normal S1S2, no murmur, rub, or gallop  · RESPIRATORY: Normal respiratory effort, clear to auscultation bilaterally  · EXTREMITIES: no edema or clubbing, +2 pulses bilaterally   · MUSCULOSKELETAL: No joint swelling or tenderness, no chest wall tenderness  · GASTROINTESTINAL: normal bowel sounds, soft, non-tender    Labs:  Lab Review   Admission on 09/07/2021, Discharged on 09/07/2021   Component Date Value    Ventricular Rate 09/07/2021 55     Atrial Rate 09/07/2021 55     P-R Interval 09/07/2021 122     QRS Duration 09/07/2021 82     Q-T Interval 09/07/2021 420     QTc Calculation (Bazett) 09/07/2021 401     P Axis 09/07/2021 48     R Axis 09/07/2021 3     T Axis 09/07/2021 11     Diagnosis 09/07/2021 Sinus bradycardiaMinimal voltage criteria for LVH, may be normal variantBorderline ECGConfirmed by OBEY Spear MD ([id number]) on 9/7/2021 3:44:53 PM     WBC 09/07/2021 6.4     RBC 09/07/2021 4.55     Hemoglobin 09/07/2021 13.8     Hematocrit 09/07/2021 39.7     MCV 09/07/2021 87.2     MCH 09/07/2021 30.2     MCHC 09/07/2021 34.6     RDW 09/07/2021 13.9     Platelets 66/94/2231 270     MPV 09/07/2021 7.9     Neutrophils % 09/07/2021 66.8  Lymphocytes % 09/07/2021 22.8     Monocytes % 09/07/2021 5.9     Eosinophils % 09/07/2021 3.0     Basophils % 09/07/2021 1.5     Neutrophils Absolute 09/07/2021 4.3     Lymphocytes Absolute 09/07/2021 1.5     Monocytes Absolute 09/07/2021 0.4     Eosinophils Absolute 09/07/2021 0.2     Basophils Absolute 09/07/2021 0.1     Sodium 09/07/2021 139     Potassium reflex Magnesi* 09/07/2021 3.6     Chloride 09/07/2021 103     CO2 09/07/2021 26     Anion Gap 09/07/2021 10     Glucose 09/07/2021 106*    BUN 09/07/2021 12     CREATININE 09/07/2021 0.7     GFR Non- 09/07/2021 >60     GFR  09/07/2021 >60     Calcium 09/07/2021 9.1     Total Protein 09/07/2021 7.0     Albumin 09/07/2021 4.3     Albumin/Globulin Ratio 09/07/2021 1.6     Total Bilirubin 09/07/2021 0.7     Alkaline Phosphatase 09/07/2021 96     ALT 09/07/2021 16     AST 09/07/2021 18     Globulin 09/07/2021 2.7     Troponin 09/07/2021 <0.01     TSH 09/07/2021 1.92        Imaging:  I have reviewed the below testing personally:    EKG 9/8/14  Normal sinus rhythm   Early repolarization    EKG 9/7/21  Sinus bradycardia  Minimal voltage criteria for LVH, may be normal variant     CXR 9/7/21  FINDINGS:   Clear lungs. No pleural effusion or pneumothorax. Cardiomediastinal silhouette is unremarkable. Visualized osseous structures are unremarkable. [facility]:   June 2020 : TTE report conclusion personally reviewed LV ejection fraction of 65%, mild LVH, grade 2 diastolic dysfunction, dilated left atrium, mild tricuspid regurgitation, mild pulmonary hypertension. January 2021: Pulmonary function test with severe obstructive disease    1/2021: , LDL 87, TG 87, HDL 65     9/7/21  Troponin negative  TSH 1.92  K 3.6  Hemoglobin 13.8  Hematocrit 39.7      Impression/Recommendations    Ms. Beni Thakur is a 44 y.o. female patient with:    Palpitations  Dyspnea on exertion  Hypertension  Prediabetes  Morbid Obesity   FREIDA  Asthma   Hx. COVID 19 Infection 9/2020      In meeting Taryn for the first time, she reports palpitations with a first-time Holter monitor in 2019. She reports an insignificant amount of PACs and PVCs and no beta-blocker at that time. Baseline echocardiogram last year was with mild, largely hypertensive related findings. She states she was just recently started on hydrochlorothiazide. I have asked her to keep blood pressure logging for us to review and she is leaving with a 30 day monitor. We will update echocardiogram. I have also asked her to establish with Pulmonology as PFTs from earlier this year suggested severe obstructive disease. They can review her medications/inhalers as well as CPAP. Orders Placed This Encounter   Procedures   Theodora Beaulieu MD, Pulmonary, [facility]     Referral Priority:   Routine     Referral Type:   Eval and Treat     Referral Reason:   Specialty Services Required     Referred to Provider:   Latonia Membreno MD     Requested Specialty:   Pulmonology     Number of Visits Requested:   1    Cardiac event monitor     30 days     Standing Status:   Future     Standing Expiration Date:   9/14/2022    Echo 2D w doppler w color complete     Standing Status:   Future     Standing Expiration Date:   9/14/2022     Order Specific Question:   Reason for exam:     Answer:   hx pvc, htn, palpitations     Return for Echocardiogram.  Patient Instructions   Echocardiogram   Event monitor (30 days)    We will call you after testing with results     Consider asking your pulmonologist if it is worth repeating any pulmonary testing since you are further out from your past COVID-19 infection     Thank you for allowing me to participate in the care of your patient. Please do not hesitate to call.      Rosa Linares DO, [facility]  Interventional Cardiology     o: [phone]  [address] [place] [address], [address], [address]      NOTE:  This report was transcribed using voice recognition software. Every effort was made to ensure accuracy; however, inadvertent computerized transcription errors may be present. Scribe's Attestation: This note was scribed in the presence of Dr. Holger Antoine DO by José Miguel Duarte RN.    I, Maurizio Chance, have personally performed the services described in this documentation as scribed by Subhash Davis RN in my presence, and it is both accurate and complete. An electronic signature was used to authenticate this note.                  Sincerely,      Radhames Fofana DO

## 2021-09-17 ENCOUNTER — OFFICE VISIT (OUTPATIENT)
Dept: PULMONOLOGY | Age: 51
End: 2021-09-17
Payer: COMMERCIAL

## 2021-09-17 VITALS
HEIGHT: 65 IN | OXYGEN SATURATION: 100 % | DIASTOLIC BLOOD PRESSURE: 54 MMHG | SYSTOLIC BLOOD PRESSURE: 122 MMHG | BODY MASS INDEX: 47.48 KG/M2 | WEIGHT: 285 LBS | HEART RATE: 71 BPM

## 2021-09-17 DIAGNOSIS — J30.2 SEASONAL ALLERGIC RHINITIS, UNSPECIFIED TRIGGER: ICD-10-CM

## 2021-09-17 DIAGNOSIS — G47.33 OSA (OBSTRUCTIVE SLEEP APNEA): ICD-10-CM

## 2021-09-17 DIAGNOSIS — J45.40 MODERATE PERSISTENT ASTHMA WITHOUT COMPLICATION: Primary | ICD-10-CM

## 2021-09-17 PROCEDURE — G8417 CALC BMI ABV UP PARAM F/U: HCPCS | Performed by: INTERNAL MEDICINE

## 2021-09-17 PROCEDURE — 1036F TOBACCO NON-USER: CPT | Performed by: INTERNAL MEDICINE

## 2021-09-17 PROCEDURE — 99204 OFFICE O/P NEW MOD 45 MIN: CPT | Performed by: INTERNAL MEDICINE

## 2021-09-17 PROCEDURE — 3017F COLORECTAL CA SCREEN DOC REV: CPT | Performed by: INTERNAL MEDICINE

## 2021-09-17 PROCEDURE — G8427 DOCREV CUR MEDS BY ELIG CLIN: HCPCS | Performed by: INTERNAL MEDICINE

## 2021-09-17 RX ORDER — FLUTICASONE PROPIONATE 50 MCG
1 SPRAY, SUSPENSION (ML) NASAL DAILY
Qty: 16 G | Refills: 0 | Status: SHIPPED | OUTPATIENT
Start: 2021-09-17 | End: 2021-12-06

## 2021-09-17 NOTE — PROGRESS NOTES
PULMONARY OFFICE NEW PATIENT VISIT    CONSULTING PHYSICIAN:      REASON FOR VISIT:   Chief Complaint   Patient presents with    New Patient     Ref: Dr. Kash Moralez COPD    Shortness of Breath     worse with exertion since COVID 09/2020       DATE OF VISIT: 9/17/2021    HISTORY OF PRESENT ILLNESS: 44y.o. year old female with past medical history of asthma, obstructive sleep apnea, obesity who is here for evaluation of shortness of breath. Patient stated that she has had asthma all her life and was diagnosed in her 35s. She has used inhaler off-and-on but has not required use of the inhalers consistently. She patient stated that she developed Covid in September 2020 and since that she has been noticing dyspnea on exertion. She gets short of breath on walking long distances climb a flight of stairs and doing heavy exertion. She has some cough which is off and on and mostly dry. No wheezing or chest pain or chest pressure. She also have allergic rhinitis and has seasonal allergies which are mostly worse in spring and fall. She is complains of sinus congestion and postnasal drip. Currently she is using Bevespi inhaler and Ventolin inhaler as needed. She is also on Singulair and Astelin nasal spray. She has had allergy shots in the past.  She has allergist.    She was also diagnosed with obstructive sleep apnea with sleep study performed last year that showed overall AHI of 9 and REM AHI of 19. Currently she has CPAP of 6 cm of water. DME company is [employer]. Sleep study was performed at [facility]. Patient states that she has been having some issues with her CPAP and feels that she is not getting enough air. DIAGNOSTIC TEST REVIEWED:  I reviewed the chest x-ray from 9/7/2021 and my interpretation is as follows. No infiltrates or emphysema noted. I reviewed the PFT from 9/16/2021 and my interpretation is as follows.   Moderate obstructive airway disease with good bronchodilator response. FeV1/FVC: 70  FeV1: 58% which improved to 69% %, 1.99 liters, good bronchodilator response of 19%  FVC: 78%, 2.85 liters  T%, 2.81 liters  DLCO: 98%      REVIEW OF SYSTEMS:   CONSTITUTIONAL SYMPTOMS: The patient denies fever, fatigue, night sweats, weight loss or weight gain. HEENT: No vision changes. No tinnitus, Denies sinus pain. No hoarseness, or dysphagia. NECK: Patient denies swelling in the neck. CARDIOVASCULAR: Denies chest pain, palpitation, syncope. RESPIRATORY: See above. GASTROINTESTINAL: Denies nausea, abdominal pain or change in bowel function. GENITOURINARY: Denies obstructive symptoms. No history of incontinence. BREASTS: No masses or lumps in the breasts. SKIN: No rashes or itching. MUSCULOSKELETAL: Denies weakness or bone pain. NEUROLOGICAL: No headaches or seizures. PSYCHIATRIC: Denies mood swings or depression. ENDOCRINE: Denies heat or cold intolerance or excessive thirst.  HEMATOLOGIC/LYMPHATIC: Denies easy bruising or lymph node swelling. ALLERGIC/IMMUNOLOGIC: No environmental allergies.     PAST MEDICAL HISTORY:   Past Medical History:   Diagnosis Date    Acid reflux     Chronic diastolic heart failure (HCC)     COPD (chronic obstructive pulmonary disease) (HCC)     Hypertension     FREIDA (obstructive sleep apnea)     PCOS (polycystic ovarian syndrome)     Pulmonary hypertension (HCC)     PVC (premature ventricular contraction)     Urge incontinence        PAST SURGICAL HISTORY:   Past Surgical History:   Procedure Laterality Date    APPENDECTOMY      CARPAL TUNNEL RELEASE Bilateral     CHOLECYSTECTOMY      CYST REMOVAL Right     finger     HYSTERECTOMY      TONSILLECTOMY          SOCIAL HISTORY:   Social History     Tobacco Use    Smoking status: Never Smoker    Smokeless tobacco: Never Used   Substance Use Topics    Alcohol use: No    Drug use: No       FAMILY HISTORY:   Family History   Problem Relation Age of Onset    lower extremities. SKIN OF BODY: No rash or jaundice. PSYCHIATRIC EVALUATION: Normal affect. Patient answers questions appropriately. HEMATOLOGIC/LYMPHATIC/ IMMUNOLOGIC: No palpable lymphadenopathy. NEUROLOGIC: Alert and oriented x 3. Groslly non-focal. Motor strength is 5+/5 in all muscle groups. The patient has a normal sensorium globally. LABS:  Lab Results   Component Value Date    WBC 6.4 09/07/2021    HGB 13.8 09/07/2021    HCT 39.7 09/07/2021     09/07/2021    ALT 16 09/07/2021    AST 18 09/07/2021     09/07/2021    K 3.6 09/07/2021     09/07/2021    CREATININE 0.7 09/07/2021    BUN 12 09/07/2021    CO2 26 09/07/2021       Lab Results   Component Value Date    GLUCOSE 106 (H) 09/07/2021    CALCIUM 9.1 09/07/2021     09/07/2021    K 3.6 09/07/2021    CO2 26 09/07/2021     09/07/2021    BUN 12 09/07/2021    CREATININE 0.7 09/07/2021           ASSESSMENT AND PLAN:     1. Moderate persistent asthma without complication  PFT shows moderate obstructive airway disease with good bronchodilator response. Patient's asthma is uncontrolled. She is on LAMA/LABA inhaler. I will switch to ICS/LAMA inhaler which is preferred in patients with asthma. I personally went over inhaler technique with the patient in the clinic.    - Fluticasone furoate-vilanterol (BREO ELLIPTA) 200-25 MCG/INH AEPB inhaler; Inhale 1 puff into the lungs daily  Dispense: 3 each; Refill: 3      2. Allergic rhinitis  Uncontrolled allergies. Continue Singulair 10 mg daily. Continue azelastine nasal spray as needed. Add Flonase nasal spray. If no improvement, then patient would need allergy testing.    - fluticasone (FLONASE) 50 MCG/ACT nasal spray; 1 spray by Each Nostril route daily  Dispense: 16 g; Refill: 0      3. Obstructive sleep apnea  Currently on CPAP of 6 cm of water. DME company is [employer]. Obtain CPAP download. Obtain sleep study.       4.  Morbid obesity  I have advised the patient to encourage daily exercise and walking to help lose weight. Return in about 8 weeks (around 11/12/2021). Anna Saldana MD  Pulmonary Critical Care and Sleep Medicine  Electronically signed by Anna Saldana MD on 9/17/2021 at 8:57 AM     This note was completed using dragon medical speech recognition software. Grammatical errors, random word insertions, pronoun errors and incomplete sentences are occasional consequences of this technology due to software limitations. If there are questions or concerns about the content of this note of information contained within the body of this dictation they should be addressed with the provider for clarification.

## 2021-09-17 NOTE — LETTER
[facility] Pulmonary, Critical Care & Sleep  [address]  [address] [place] [address]  Phone: [phone]  Fax: [phone]           Crescencio Kemp MD      September 17, 2021     Patient: Arslan Martin   MR Number: [id number]   YOB: 1970   Date of Visit: 9/17/2021       Dear Dr. Viola Gordon:    Thank you for referring Demi Hicks to me for evaluation/treatment. Below are the relevant portions of my assessment and plan of care. If you have questions, please do not hesitate to call me. I look forward to following Taryn along with you.     Sincerely,        Crescencio Kemp MD    CC providers:  Casey Gallardo DO  [facility] [place] [address]  [address] [place]

## 2021-09-22 ENCOUNTER — PROCEDURE VISIT (OUTPATIENT)
Dept: CARDIOLOGY CLINIC | Age: 51
End: 2021-09-22
Payer: COMMERCIAL

## 2021-09-22 DIAGNOSIS — I10 ESSENTIAL HYPERTENSION: ICD-10-CM

## 2021-09-22 DIAGNOSIS — Z86.16 HISTORY OF 2019 NOVEL CORONAVIRUS DISEASE (COVID-19): ICD-10-CM

## 2021-09-22 DIAGNOSIS — I49.3 PVC (PREMATURE VENTRICULAR CONTRACTION): ICD-10-CM

## 2021-09-22 DIAGNOSIS — R06.02 SOB (SHORTNESS OF BREATH): ICD-10-CM

## 2021-09-22 DIAGNOSIS — R00.2 PALPITATIONS: ICD-10-CM

## 2021-09-22 LAB
LV EF: 58 %
LVEF MODALITY: NORMAL

## 2021-09-22 PROCEDURE — 93306 TTE W/DOPPLER COMPLETE: CPT | Performed by: INTERNAL MEDICINE

## 2021-10-18 ENCOUNTER — TELEPHONE (OUTPATIENT)
Dept: CARDIOLOGY CLINIC | Age: 51
End: 2021-10-18

## 2021-10-22 NOTE — TELEPHONE ENCOUNTER
Awaiting official read from [address] [facility]. There is nothing urgent on this monitor she had one episode of tachycardia that lasted 6 beats, most like PAT.  She was symptomatic but also symptomatic with Sinus rhythm

## 2021-10-22 NOTE — TELEPHONE ENCOUNTER
Gave preliminary results- will await final EP interpretation    Admits she has occasional quick incidents where her Carlos Idol is taken away\" and is brief. Feels the episode of tachycardia was one of these rare intermittent episodes she feels may be stress related. Denies significant recurrence and states she will go a month without it happening on occasion. No changes now per [facility], will await final interpretation.

## 2021-12-09 PROCEDURE — 93272 ECG/REVIEW INTERPRET ONLY: CPT | Performed by: INTERNAL MEDICINE

## 2022-12-12 RX ORDER — FLUTICASONE FUROATE AND VILANTEROL 200; 25 UG/1; UG/1
1 POWDER RESPIRATORY (INHALATION) DAILY
Qty: 3 EACH | Refills: 3 | Status: SHIPPED | OUTPATIENT
Start: 2022-12-12